# Patient Record
Sex: FEMALE | Race: WHITE | NOT HISPANIC OR LATINO | Employment: OTHER | URBAN - METROPOLITAN AREA
[De-identification: names, ages, dates, MRNs, and addresses within clinical notes are randomized per-mention and may not be internally consistent; named-entity substitution may affect disease eponyms.]

---

## 2017-04-05 ENCOUNTER — HISTORICAL (OUTPATIENT)
Dept: ADMINISTRATIVE | Facility: HOSPITAL | Age: 77
End: 2017-04-05

## 2017-05-04 ENCOUNTER — HISTORICAL (OUTPATIENT)
Dept: ADMINISTRATIVE | Facility: HOSPITAL | Age: 77
End: 2017-05-04

## 2022-04-30 NOTE — OP NOTE
Patient:   Kimberly Morales            MRN: [id number]            FIN: [id number]               Age:   76 years     Sex:  Female     :  1940   Associated Diagnoses:   None   Author:   Jonel Vanessa MD      Preoperative Diagnosis: Cataract Left eye    Postoperative Diagnosis: Cataract Left eye    Procedure: Phacoemulsification with intaocular lens implantations Left eye    Surgeon: Jonel Vanessa MD    Assistant: EMILI Law    Anestheisa: Topical    Complications: None    The patient was brought to the [facility] laser. A surgical timeout, capsulotomy, and lens fragmentation were performed.  The patient was brought into the operating suite, where the patient was correctly identified as was the operative eye via timeout.  The patient was prepped and draped in a sterile ophthalmic fashion.  A lid speculum was placed in the operative eye and the microscope was brought into place.  A 1.0mm paracentesis was then made at (6) o'clock.  The anterior chamber was filled with Endocoat.  A (temporal) clear corneal incision was made with a 2.4 mm keratome.  A 6 mm corneal marking ring was used to tamia the cornea centered over the visual axis.  A 5.00 mm continuous curvilinear capsulorhexis was fashioned using a cystotome and microcapsular forceps.  Hydrodissection and hydrodelineation was performed with upreserved 1% Xylocaine.  The nucleus was then phacoemulsified with the Abbott phacoemulsification hand-piece with a total of (7) EFX.  The cortex was then removed with the Kwadwo I/A hand-piece. An CATARINO lens model (ZCB00) with a power of (21.5) was placed in the capsular bag.  The Helon was then removed from the eye with the Kwadwo I/A hand piece.  The anterior chamber was inflated and the wounds were hydrated with BSS.  The wounds were checked with Weck-Destiny sponges and found to be watertight.  The lid speculum was removed and topical antibiotics were placed on the operative eye.  The patient was brought to PACU in  good condition.      [facility]

## 2023-02-03 ENCOUNTER — OFFICE VISIT (OUTPATIENT)
Dept: URGENT CARE | Facility: CLINIC | Age: 83
End: 2023-02-03
Payer: OTHER GOVERNMENT

## 2023-02-03 VITALS
BODY MASS INDEX: 27.31 KG/M2 | HEIGHT: 64 IN | HEART RATE: 62 BPM | WEIGHT: 160 LBS | SYSTOLIC BLOOD PRESSURE: 154 MMHG | OXYGEN SATURATION: 100 % | RESPIRATION RATE: 16 BRPM | TEMPERATURE: 99 F | DIASTOLIC BLOOD PRESSURE: 85 MMHG

## 2023-02-03 DIAGNOSIS — K59.00 CONSTIPATION, UNSPECIFIED CONSTIPATION TYPE: Primary | ICD-10-CM

## 2023-02-03 DIAGNOSIS — R10.9 ABDOMINAL CRAMPS: ICD-10-CM

## 2023-02-03 LAB
BILIRUB UR QL STRIP: NEGATIVE
GLUCOSE UR QL STRIP: NEGATIVE
KETONES UR QL STRIP: NEGATIVE
LEUKOCYTE ESTERASE UR QL STRIP: NEGATIVE
PH, POC UA: 6.5
POC BLOOD, URINE: NEGATIVE
POC NITRATES, URINE: NEGATIVE
PROT UR QL STRIP: NEGATIVE
SP GR UR STRIP: 1.01 (ref 1–1.03)
UROBILINOGEN UR STRIP-ACNC: NORMAL (ref 0.1–1.1)

## 2023-02-03 PROCEDURE — 81003 URINALYSIS AUTO W/O SCOPE: CPT | Mod: QW,,, | Performed by: FAMILY MEDICINE

## 2023-02-03 PROCEDURE — 81003 POCT URINALYSIS, DIPSTICK, MANUAL, W/O SCOPE: ICD-10-PCS | Mod: QW,,, | Performed by: FAMILY MEDICINE

## 2023-02-03 PROCEDURE — 99203 PR OFFICE/OUTPT VISIT, NEW, LEVL III, 30-44 MIN: ICD-10-PCS | Mod: ,,, | Performed by: FAMILY MEDICINE

## 2023-02-03 PROCEDURE — 99203 OFFICE O/P NEW LOW 30 MIN: CPT | Mod: ,,, | Performed by: FAMILY MEDICINE

## 2023-02-03 RX ORDER — LACTULOSE 10 G/15ML
10 SOLUTION ORAL 2 TIMES DAILY
Qty: 300 ML | Refills: 0 | Status: SHIPPED | OUTPATIENT
Start: 2023-02-03 | End: 2023-02-10

## 2023-02-03 RX ORDER — VALSARTAN AND HYDROCHLOROTHIAZIDE 160; 25 MG/1; MG/1
TABLET ORAL
COMMUNITY

## 2023-02-03 NOTE — PROGRESS NOTES
"Subjective:       Patient ID: Kimberly Morales is a 82 y.o. female.    Vitals:  height is 5' 4" (1.626 m) and weight is 72.6 kg (160 lb). Her temperature is 98.8 °F (37.1 °C). Her blood pressure is 154/85 (abnormal) and her pulse is 62. Her respiration is 16 and oxygen saturation is 100%.     Chief Complaint: Chills (Chills and fever 2 weeks ago. Still not feeling to good with bowel. Not eating much, stool watery. Abdominal cramps. Feels as though she needs to go but not as much. Slight headache. Hx of c.diff in the past.  )    HPI:  82-year-old female with known history of hypertension present to clinic with concerns of abdominal cramping and watery stool since 6-7 days.  No blood in the stools.  Denies bladder symptoms like urgency or frequency.  History of cystocele.  States 2 weeks ago started with fever and chills.   In the family  has prescribed antibiotics for possible infection.  History of C diff many years ago.  Appears concerned with recent use of antibiotics.  Cannot recall which medicine.    ROS  :  Constitutional : Negative for fever, + weakness  HEENT : No sore throat,No earache  Neck : Negative except HPI  Respiratory : Negative for shortness of breath and wheezing  Cardiovascular : Negative for chest pain, no palpitations   Gastrointestinal : Negative except as documented in HPI  Genitourinary : Negative for burning urination, urgency and frequency  Integumentary : Negative for skin rash  Neurological : Negative except HPI   Objective:      Physical Exam    General : Alert and Oriented, No apparent distress, afebrile, appears comfortable sitting in exam chair, clear speech and appropriate communication  Neck - supple  HENT : Oropharynx no redness or swelling.   Respiratory : Bilateral equal breath sounds, nonlabored respirations  Cardiovascular : Rate, rhythm regular, normal volume pulse, no murmur  Gastrointestinal: Full abdomen, soft, nontender to palpate, bowel sounds present all 4 " quadrants  Integumentary : Warm, Dry and no rash    Assessment:       1. Constipation, unspecified constipation type    2. Abdominal cramps          Plan:     Considering the symptoms x-rays today.  Showing residual stool pattern throughout the colon.  Nonspecific nonobstructive gas pattern.  Radiology final results will be monitored and reported.  Discussed in detail on constipation, hydration, activities as tolerated.  Trial of medications as directed.  Urine dipstick appears normal.  ER precautions with any acute change in symptoms.  Call this clinic for any questions.  Follow-up with primary MD    Constipation, unspecified constipation type    Abdominal cramps  -     XR ABDOMEN FLAT AND ERECT; Future; Expected date: 02/03/2023  -     POCT Urinalysis, Dipstick, Manual, W/O Scope

## 2023-02-03 NOTE — PATIENT INSTRUCTIONS
Considering the symptoms x-rays today.  Showing residual stool pattern throughout the colon.  Nonspecific nonobstructive gas pattern.  Radiology final results will be monitored and reported.  Discussed in detail on constipation, hydration, activities as tolerated.  Trial of medications as directed.  Urine dipstick appears normal.  ER precautions with any acute change in symptoms.  Call this clinic for any questions.  Follow-up with primary MD